# Patient Record
Sex: FEMALE | Race: WHITE | NOT HISPANIC OR LATINO | RURAL
[De-identification: names, ages, dates, MRNs, and addresses within clinical notes are randomized per-mention and may not be internally consistent; named-entity substitution may affect disease eponyms.]

---

## 2022-02-19 ENCOUNTER — OFFICE VISIT (OUTPATIENT)
Dept: RURAL CLINIC 2 | Facility: CLINIC | Age: 27
End: 2022-02-19
Payer: COMMERCIAL

## 2022-02-19 ENCOUNTER — WEB ENCOUNTER (OUTPATIENT)
Dept: RURAL CLINIC 6 | Facility: CLINIC | Age: 27
End: 2022-02-19

## 2022-02-19 DIAGNOSIS — R74.8 ABNORMAL LIVER ENZYMES: ICD-10-CM

## 2022-02-19 PROCEDURE — 99204 OFFICE O/P NEW MOD 45 MIN: CPT | Performed by: INTERNAL MEDICINE

## 2022-02-19 PROCEDURE — 99244 OFF/OP CNSLTJ NEW/EST MOD 40: CPT | Performed by: INTERNAL MEDICINE

## 2022-02-19 NOTE — HPI-TODAY'S VISIT:
this patient comes in referral from Kaur Gonzalez.  A copy of the consultation will be sent to the referring provider.  Patient recently had a right upper quadrant ultrasound that was normal.  Liver enzymes have been elevated dating back to October of 2021.  Most recent labs am able to see show an AST and ALT of 211 and 96 respectively with an alkaline phosphatase of 123.  Total bilirubin normal at 0.2. -  she does not take herbal supplements or any over-the-counter products.  She denies heavy NSAID or Tylenol use.  No family history of liver disease and she does not consume alcohol.

## 2022-02-25 LAB
FERRITIN, SERUM: 54
IRON BIND.CAP.(TIBC): 423
IRON SATURATION: 14
IRON: 59
UIBC: 364
WRITTEN AUTHORIZATION: (no result)

## 2022-02-26 LAB
ACTIN (SMOOTH MUSCLE) ANTIBODY: 10
ALPHA-1-ANTITRYPSIN, SERUM: 179
ANA DIRECT: NEGATIVE
CERULOPLASMIN: 47.5
GGT: (no result)
HEREDITARY  HEMOCHROMATOSIS: (no result)
LIVER-KIDNEY MICROSOMAL AB: 1.3
MITOCHONDRIAL (M2) ANTIBODY: <20
PHENOTYPE (PI): (no result)
REQUEST PROBLEM: (no result)

## 2022-02-28 ENCOUNTER — TELEPHONE ENCOUNTER (OUTPATIENT)
Dept: URBAN - METROPOLITAN AREA CLINIC 105 | Facility: CLINIC | Age: 27
End: 2022-02-28

## 2022-04-14 ENCOUNTER — WEB ENCOUNTER (OUTPATIENT)
Dept: RURAL CLINIC 4 | Facility: CLINIC | Age: 27
End: 2022-04-14

## 2022-04-14 ENCOUNTER — OFFICE VISIT (OUTPATIENT)
Dept: RURAL CLINIC 2 | Facility: CLINIC | Age: 27
End: 2022-04-14
Payer: COMMERCIAL

## 2022-04-14 DIAGNOSIS — R10.13 EPIGASTRIC PAIN: ICD-10-CM

## 2022-04-14 DIAGNOSIS — R11.14 BILIOUS VOMITING WITH NAUSEA: ICD-10-CM

## 2022-04-14 DIAGNOSIS — Q45.3 ABNORMALITY OF PANCREATIC DUCT: ICD-10-CM

## 2022-04-14 DIAGNOSIS — R17 ELEVATED BILIRUBIN: ICD-10-CM

## 2022-04-14 DIAGNOSIS — R79.89 ABNORMAL LFTS: ICD-10-CM

## 2022-04-14 PROBLEM — 235969000: Status: ACTIVE | Noted: 2022-04-14

## 2022-04-14 PROCEDURE — 99214 OFFICE O/P EST MOD 30 MIN: CPT | Performed by: INTERNAL MEDICINE

## 2022-04-14 NOTE — HPI-TODAY'S VISIT:
this patient comes in referral from Kaur Gonzalez.  A copy of the consultation will be sent to the referring provider.  Patient recently had a right upper quadrant ultrasound that was normal.  Liver enzymes have been elevated dating back to October of 2021.  Most recent labs am able to see show an AST and ALT of 211 and 96 respectively with an alkaline phosphatase of 123.  Total bilirubin normal at 0.2. -  she does not take herbal supplements or any over-the-counter products.  She denies heavy NSAID or Tylenol use.  No family history of liver disease and she does not consume alcohol. - 4/14/22:  At the patient's previous will visit we sent office serological evaluation to determine the etiology of her elevated alkaline phosphatase along with AST and ALT.  KEYLA, smooth muscle antibody, anti mitochondrial antibody were all normal.  Patient had a normal alpha-1 antitrypsin level and genotype.  Her genetic studies were negative for hemochromatosis.  She did have a slightly elevated ceruloplasmin and I requested a  24 hour urine copper which was within normal limits.  Her CK was also normal.  We reviewed that her right upper quadrant ultrasound was normal. -   On April the night the patient developed acute bilateral upper abdominal pain.  She was evaluated in the emergency department.  Her AST and ALT were markedly elevated at 668 in 622 respectively alkaline phosphatase was 230 and her total bilirubin was 2.3.  Albumin and lipase were also normal.  CT scans showed some edema around the body and head of the pancreas suspicious for interstitial pancreatitis.  The patient was given symptomatic treatment and discharged home.  There was no mention of biliary ductal dilation on her CT scan

## 2022-04-27 ENCOUNTER — OFFICE VISIT (OUTPATIENT)
Dept: RURAL CLINIC 8 | Facility: CLINIC | Age: 27
End: 2022-04-27

## 2022-05-05 ENCOUNTER — TELEPHONE ENCOUNTER (OUTPATIENT)
Dept: RURAL CLINIC 2 | Facility: CLINIC | Age: 27
End: 2022-05-05

## 2022-05-06 ENCOUNTER — TELEPHONE ENCOUNTER (OUTPATIENT)
Dept: URBAN - METROPOLITAN AREA CLINIC 105 | Facility: CLINIC | Age: 27
End: 2022-05-06

## 2022-06-03 ENCOUNTER — OFFICE VISIT (OUTPATIENT)
Dept: RURAL CLINIC 2 | Facility: CLINIC | Age: 27
End: 2022-06-03

## 2022-10-30 NOTE — PHYSICAL EXAM SKIN:
no rashes , no suspicious lesions , no areas of discoloration , no jaundice present , good turgor , no masses , no tenderness on palpation You can access the FollowMyHealth Patient Portal offered by [facility] by registering at the following website: [email or web address] By joining [employer]’s FollowMyHealth portal, you will also be able to view your health information using other applications (apps) compatible with our system. You can access the FollowMyHealth Patient Portal offered by [facility] by registering at the following website: [email or web address] By joining "[employer]’s FollowMyHealth portal, you will also be able to view your health information using other applications (apps) compatible with our system.

## 2022-11-03 ENCOUNTER — DASHBOARD ENCOUNTERS (OUTPATIENT)
Age: 27
End: 2022-11-03

## 2022-11-10 ENCOUNTER — OFFICE VISIT (OUTPATIENT)
Dept: RURAL CLINIC 2 | Facility: CLINIC | Age: 27
End: 2022-11-10